# Patient Record
Sex: MALE | Race: BLACK OR AFRICAN AMERICAN | NOT HISPANIC OR LATINO | URBAN - METROPOLITAN AREA
[De-identification: names, ages, dates, MRNs, and addresses within clinical notes are randomized per-mention and may not be internally consistent; named-entity substitution may affect disease eponyms.]

---

## 2022-04-10 ENCOUNTER — HISTORICAL (OUTPATIENT)
Dept: ADMINISTRATIVE | Facility: HOSPITAL | Age: 32
End: 2022-04-10
Payer: MEDICAID

## 2022-04-28 VITALS
WEIGHT: 235.88 LBS | SYSTOLIC BLOOD PRESSURE: 144 MMHG | HEIGHT: 72 IN | BODY MASS INDEX: 31.95 KG/M2 | OXYGEN SATURATION: 97 % | DIASTOLIC BLOOD PRESSURE: 95 MMHG

## 2022-08-31 ENCOUNTER — OFFICE VISIT (OUTPATIENT)
Dept: URGENT CARE | Facility: CLINIC | Age: 32
End: 2022-08-31
Payer: MEDICAID

## 2022-08-31 VITALS
TEMPERATURE: 99 F | RESPIRATION RATE: 20 BRPM | DIASTOLIC BLOOD PRESSURE: 88 MMHG | OXYGEN SATURATION: 98 % | HEART RATE: 63 BPM | WEIGHT: 245.56 LBS | HEIGHT: 72 IN | BODY MASS INDEX: 33.26 KG/M2 | SYSTOLIC BLOOD PRESSURE: 134 MMHG

## 2022-08-31 DIAGNOSIS — Z11.52 ENCOUNTER FOR SCREENING FOR COVID-19: Primary | ICD-10-CM

## 2022-08-31 DIAGNOSIS — R05.9 COUGH: ICD-10-CM

## 2022-08-31 DIAGNOSIS — J01.90 ACUTE SINUSITIS, RECURRENCE NOT SPECIFIED, UNSPECIFIED LOCATION: ICD-10-CM

## 2022-08-31 LAB
CTP QC/QA: YES
CTP QC/QA: YES
FLUAV AG NPH QL: NEGATIVE
FLUBV AG NPH QL: NEGATIVE
SARS-COV-2 RDRP RESP QL NAA+PROBE: NEGATIVE

## 2022-08-31 PROCEDURE — 99213 OFFICE O/P EST LOW 20 MIN: CPT | Mod: S$PBB,,, | Performed by: FAMILY MEDICINE

## 2022-08-31 PROCEDURE — 99214 OFFICE O/P EST MOD 30 MIN: CPT | Mod: PBBFAC | Performed by: FAMILY MEDICINE

## 2022-08-31 PROCEDURE — 99213 PR OFFICE/OUTPT VISIT, EST, LEVL III, 20-29 MIN: ICD-10-PCS | Mod: S$PBB,,, | Performed by: FAMILY MEDICINE

## 2022-08-31 PROCEDURE — 87804 INFLUENZA ASSAY W/OPTIC: CPT | Mod: PBBFAC | Performed by: FAMILY MEDICINE

## 2022-08-31 PROCEDURE — U0002 COVID-19 LAB TEST NON-CDC: HCPCS | Mod: PBBFAC | Performed by: FAMILY MEDICINE

## 2022-08-31 RX ORDER — PROMETHAZINE HYDROCHLORIDE AND DEXTROMETHORPHAN HYDROBROMIDE 6.25; 15 MG/5ML; MG/5ML
5 SYRUP ORAL
Qty: 120 ML | Refills: 0 | Status: SHIPPED | OUTPATIENT
Start: 2022-08-31 | End: 2023-01-29

## 2022-08-31 RX ORDER — AZITHROMYCIN 250 MG/1
TABLET, FILM COATED ORAL
Qty: 6 TABLET | Refills: 0 | Status: SHIPPED | OUTPATIENT
Start: 2022-08-31 | End: 2022-09-05

## 2022-08-31 NOTE — PROGRESS NOTES
"Subjective:       Patient ID: Allan Jackson Jr. is a 31 y.o. male.    Vitals:  height is 6' 0.44" (1.84 m) and weight is 111.4 kg (245 lb 9.5 oz). His temperature is 98.6 °F (37 °C). His blood pressure is 134/88 and his pulse is 63. His respiration is 20 and oxygen saturation is 98%.     Chief Complaint: Cough (Coughing up a little blood - Entered by patient/X 1 1/2 wks)    HPI  1.5 weeks of sinus pressure, cough, rhinorrhea, coughed up a small amount bloody mucus yesterday.  Denies wheezing or shortness of breath.  No chest discomfort.  No known sick contacts  ROS    Constitutional: negative except as stated in HPI  Eye: negative except as stated in HPI  ENT: negative except as stated in HPI  Respiratory: negative except as stated in HPI  Cardiovascular: negative except as stated in HPI  Gastrointestinal: negative except as stated in HPI  Genitourinary: negative except as stated in HPI  Objective:      Physical Exam    GENERAL:  Nontoxic.  Well developed and well nourished.   Appears well hydrated.  No respiratory distress  HEAD:  No signs of head trauma.  EYES:  Pupils are equal.  Extraocular motions intact  NOSE:  No sinus tenderness.  Bilateral maxillary sinus tenderness  MOUTH:  Oropharynx is clear, uvula midline  NECK:  Supple, nontender, no masses.  Full range of motion without pain.  No meningismus     CHEST:  nontender to palpation, clear breath sounds bilaterally, no wheezes, crackles, or rhonchi.  No increased work of breathing  CARDIOVASCULAR:  Regular      Results for orders placed or performed in visit on 08/31/22   POCT COVID-19 Rapid Screening   Result Value Ref Range    POC Rapid COVID Negative Negative     Acceptable Yes    POCT Influenza A/B   Result Value Ref Range    Rapid Influenza A Ag Negative Negative    Rapid Influenza B Ag Negative Negative     Acceptable Yes        Assessment:       1. Encounter for screening for COVID-19    2. Cough    3. Acute sinusitis, " recurrence not specified, unspecified location            Plan:         Encounter for screening for COVID-19  -     POCT COVID-19 Rapid Screening    Cough  -     POCT Influenza A/B    Acute sinusitis, recurrence not specified, unspecified location    Other orders  -     azithromycin (Z-VEE) 250 MG tablet; Take 2 tablets (500 mg total) by mouth once daily for 1 day, THEN 1 tablet (250 mg total) once daily for 4 days.  Dispense: 6 tablet; Refill: 0  -     promethazine-dextromethorphan (PROMETHAZINE-DM) 6.25-15 mg/5 mL Syrp; Take 5 mLs by mouth every 6 to 8 hours as needed (cough).  Dispense: 120 mL; Refill: 0         Will treat as sinusitis.  Zithromax.  Phenergan DM with side effect warnings.  Please follow instructions on patient education material.  Return to urgent care in 2 to 3 days if symptoms are not improving, immediately if you develop any new or worsening symptoms.

## 2023-01-29 ENCOUNTER — OFFICE VISIT (OUTPATIENT)
Dept: URGENT CARE | Facility: CLINIC | Age: 33
End: 2023-01-29
Payer: MEDICAID

## 2023-01-29 VITALS
SYSTOLIC BLOOD PRESSURE: 144 MMHG | BODY MASS INDEX: 32.55 KG/M2 | WEIGHT: 240.31 LBS | OXYGEN SATURATION: 100 % | DIASTOLIC BLOOD PRESSURE: 98 MMHG | HEART RATE: 88 BPM | RESPIRATION RATE: 18 BRPM | TEMPERATURE: 98 F | HEIGHT: 72 IN

## 2023-01-29 DIAGNOSIS — U07.1 COVID-19: ICD-10-CM

## 2023-01-29 DIAGNOSIS — U07.1 COVID-19 VIRUS DETECTED: ICD-10-CM

## 2023-01-29 DIAGNOSIS — R68.89 FLU-LIKE SYMPTOMS: Primary | ICD-10-CM

## 2023-01-29 DIAGNOSIS — Z11.52 ENCOUNTER FOR SCREENING FOR COVID-19: ICD-10-CM

## 2023-01-29 LAB
CTP QC/QA: YES
CTP QC/QA: YES
POC MOLECULAR INFLUENZA A AGN: NEGATIVE
POC MOLECULAR INFLUENZA B AGN: NEGATIVE
SARS-COV-2 RDRP RESP QL NAA+PROBE: POSITIVE

## 2023-01-29 PROCEDURE — 99213 OFFICE O/P EST LOW 20 MIN: CPT | Mod: S$PBB,,, | Performed by: FAMILY MEDICINE

## 2023-01-29 PROCEDURE — 87502 INFLUENZA DNA AMP PROBE: CPT | Mod: PBBFAC | Performed by: FAMILY MEDICINE

## 2023-01-29 PROCEDURE — 99213 PR OFFICE/OUTPT VISIT, EST, LEVL III, 20-29 MIN: ICD-10-PCS | Mod: S$PBB,,, | Performed by: FAMILY MEDICINE

## 2023-01-29 PROCEDURE — 87635 SARS-COV-2 COVID-19 AMP PRB: CPT | Mod: PBBFAC | Performed by: FAMILY MEDICINE

## 2023-01-29 PROCEDURE — 99213 OFFICE O/P EST LOW 20 MIN: CPT | Mod: PBBFAC | Performed by: FAMILY MEDICINE

## 2023-01-29 RX ORDER — PROMETHAZINE HYDROCHLORIDE AND DEXTROMETHORPHAN HYDROBROMIDE 6.25; 15 MG/5ML; MG/5ML
5 SYRUP ORAL
Qty: 120 ML | Refills: 0 | Status: SHIPPED | OUTPATIENT
Start: 2023-01-29 | End: 2023-02-22

## 2023-01-29 RX ORDER — ONDANSETRON 8 MG/1
8 TABLET, ORALLY DISINTEGRATING ORAL EVERY 8 HOURS PRN
Qty: 10 TABLET | Refills: 0 | Status: SHIPPED | OUTPATIENT
Start: 2023-01-29

## 2023-01-29 NOTE — LETTER
January 29, 2023      [facility]  [facility] [address]  Phone: [phone]       Patient: Allan Jackson   YOB: 1990  Date of Visit: 01/29/2023    To Whom It May Concern:    Jose Luis Jackson  was at [facility] on 01/29/2023. The patient may return to work/school on 2/3/23. If you have any questions or concerns, or if I can be of further assistance, please do not hesitate to contact me.    Sincerely,    AILEEN Ochoa MD

## 2023-01-29 NOTE — LETTER
January 29, 2023      [facility]  [facility] [facility] [address]  Phone: [phone]       Patient: Allan Jackson   YOB: 1990  Date of Visit: 01/29/2023    To Whom It May Concern:    Jose Luis Jackson  was at [facility] on 01/29/2023. The patient may return to work/school on 1/30/23. If you have any questions or concerns, or if I can be of further assistance, please do not hesitate to contact me.    Sincerely,    AILEEN Ochoa MD

## 2023-01-29 NOTE — PROGRESS NOTES
"Subjective:       Patient ID: Allan Jackson Jr. is a 32 y.o. male.    Vitals:  height is 6' 0.44" (1.84 m) and weight is 109 kg (240 lb 4.8 oz). His temperature is 98.4 °F (36.9 °C). His blood pressure is 144/98 (abnormal) and his pulse is 88. His respiration is 18 and oxygen saturation is 100%.     Chief Complaint: Generalized Body Aches (Body aches, cough, vomiting, HA x 3 days Denies sore throat.)    Patient with several days of myalgias, vomiting, loose stool, no abdominal pain.  Headache with no neuro symptoms.  Cough without shortness of breath.  No chest discomfort.  Works as a     URI   Associated symptoms include joint pain and vomiting. Pertinent negatives include no abdominal pain, dysuria, joint swelling, rash, sore throat, swollen glands or wheezing.     HENT:  Negative for sore throat.    Respiratory:  Negative for wheezing.    Gastrointestinal:  Positive for vomiting. Negative for abdominal pain.   Genitourinary:  Negative for dysuria.   Skin:  Negative for rash.     Objective:      Physical Exam   Constitutional: He appears well-developed.  Non-toxic appearance. He does not appear ill. No distress.   HENT:   Head: Atraumatic.   Nose: No purulent discharge. Right sinus exhibits no maxillary sinus tenderness and no frontal sinus tenderness. Left sinus exhibits no maxillary sinus tenderness and no frontal sinus tenderness.   Eyes: Right eye exhibits no discharge. Left eye exhibits no discharge. Extraocular movement intact   Neck: Neck supple.   Cardiovascular: Regular rhythm.   Pulmonary/Chest: Effort normal and breath sounds normal. No respiratory distress. He has no wheezes. He has no rales.   Abdominal: Soft. There is no abdominal tenderness. There is no rebound and no guarding.   Lymphadenopathy:     He has no cervical adenopathy.   Neurological: He is alert.   Skin: Skin is warm, dry and not diaphoretic.   Psychiatric: His behavior is normal.   Nursing note and vitals reviewed.    "   Results for orders placed or performed in visit on 01/29/23   POCT COVID-19 Rapid Screening   Result Value Ref Range    POC Rapid COVID Positive (A) Negative     Acceptable Yes    POCT Influenza A/B MOLECULAR   Result Value Ref Range    POC Molecular Influenza A Ag Negative Negative, Not Reported    POC Molecular Influenza B Ag Negative Negative, Not Reported     Acceptable Yes        Assessment:       1. Flu-like symptoms    2. Encounter for screening for COVID-19    3. COVID-19            Plan:         Flu-like symptoms  -     POCT COVID-19 Rapid Screening  -     POCT Influenza A/B MOLECULAR    Encounter for screening for COVID-19  -     POCT COVID-19 Rapid Screening  -     POCT Influenza A/B MOLECULAR    COVID-19    Other orders  -     promethazine-dextromethorphan (PROMETHAZINE-DM) 6.25-15 mg/5 mL Syrp; Take 5 mLs by mouth every 6 to 8 hours as needed (cough). May cause sedation.  Do not drive or operate heavy machinery.  Dispense: 120 mL; Refill: 0  -     ondansetron (ZOFRAN-ODT) 8 MG TbDL; Take 1 tablet (8 mg total) by mouth every 8 (eight) hours as needed (nausea).  Dispense: 10 tablet; Refill: 0         Prescription for Phenergan DM with side effect precautions, use this before bedtime.  May use Zofran during the day.  Advised not to take at the same time.  Discussed COVID 19 isolation instructions, contagious precautions.  Use symptomatic medications as needed.  ER precautions.  Please read patient education material.

## 2023-02-22 ENCOUNTER — OFFICE VISIT (OUTPATIENT)
Dept: URGENT CARE | Facility: CLINIC | Age: 33
End: 2023-02-22
Payer: MEDICAID

## 2023-02-22 VITALS
TEMPERATURE: 98 F | WEIGHT: 241 LBS | BODY MASS INDEX: 32.64 KG/M2 | HEIGHT: 72 IN | SYSTOLIC BLOOD PRESSURE: 111 MMHG | DIASTOLIC BLOOD PRESSURE: 82 MMHG | HEART RATE: 84 BPM | RESPIRATION RATE: 18 BRPM | OXYGEN SATURATION: 99 %

## 2023-02-22 DIAGNOSIS — J01.90 ACUTE SINUSITIS, RECURRENCE NOT SPECIFIED, UNSPECIFIED LOCATION: Primary | ICD-10-CM

## 2023-02-22 PROCEDURE — 99213 PR OFFICE/OUTPT VISIT, EST, LEVL III, 20-29 MIN: ICD-10-PCS | Mod: S$PBB,,, | Performed by: FAMILY MEDICINE

## 2023-02-22 PROCEDURE — 99213 OFFICE O/P EST LOW 20 MIN: CPT | Mod: S$PBB,,, | Performed by: FAMILY MEDICINE

## 2023-02-22 PROCEDURE — 99214 OFFICE O/P EST MOD 30 MIN: CPT | Mod: PBBFAC | Performed by: FAMILY MEDICINE

## 2023-02-22 RX ORDER — DOXYCYCLINE 100 MG/1
100 CAPSULE ORAL EVERY 12 HOURS
Qty: 20 CAPSULE | Refills: 0 | Status: SHIPPED | OUTPATIENT
Start: 2023-02-22 | End: 2023-03-04

## 2023-02-22 RX ORDER — ALBUTEROL SULFATE 90 UG/1
2 AEROSOL, METERED RESPIRATORY (INHALATION) EVERY 6 HOURS PRN
Qty: 18 G | Refills: 2 | Status: SHIPPED | OUTPATIENT
Start: 2023-02-22 | End: 2024-02-22

## 2023-02-22 RX ORDER — PROMETHAZINE HYDROCHLORIDE AND DEXTROMETHORPHAN HYDROBROMIDE 6.25; 15 MG/5ML; MG/5ML
5 SYRUP ORAL
Qty: 120 ML | Refills: 0 | Status: SHIPPED | OUTPATIENT
Start: 2023-02-22

## 2023-02-22 NOTE — PROGRESS NOTES
"Subjective:       Patient ID: Allan Jackson Jr. is a 32 y.o. male.    Vitals:  height is 6' 0.44" (1.84 m) and weight is 109.3 kg (241 lb). His oral temperature is 98.1 °F (36.7 °C). His blood pressure is 111/82 and his pulse is 84. His respiration is 18 and oxygen saturation is 99%.     Chief Complaint: Cough, Sinus Problem, and Otalgia    4 days of sinus pressure, cough with wheezing, no fever.  Right ear pressure, popping.  Had COVID 1 month ago    Cough  Associated symptoms include ear pain, nasal congestion and postnasal drip. Pertinent negatives include no chills, hemoptysis, rash or sore throat.   Sinus Problem  Associated symptoms include coughing and ear pain. Pertinent negatives include no chills, neck pain, sore throat or swollen glands.   Otalgia   Associated symptoms include coughing. Pertinent negatives include no neck pain, rash or sore throat.     Constitution: Negative for chills.   HENT:  Positive for ear pain and postnasal drip. Negative for sore throat.    Neck: Negative for neck pain.   Respiratory:  Positive for cough. Negative for bloody sputum.    Skin:  Negative for rash.     Objective:      Physical Exam   Constitutional: He appears well-developed.  Non-toxic appearance. He does not appear ill. No distress.   HENT:   Head: Atraumatic.   Ears:   Right Ear: Tympanic membrane normal.   Left Ear: Tympanic membrane normal.   Nose: No purulent discharge. Right sinus exhibits maxillary sinus tenderness. Right sinus exhibits no frontal sinus tenderness. Left sinus exhibits maxillary sinus tenderness. Left sinus exhibits no frontal sinus tenderness.   Mouth/Throat: No oropharyngeal exudate or posterior oropharyngeal erythema.   Eyes: Right eye exhibits no discharge. Left eye exhibits no discharge. Extraocular movement intact   Neck: Neck supple.   Cardiovascular: Regular rhythm.   Pulmonary/Chest: Effort normal and breath sounds normal. No respiratory distress. He has no wheezes. He has no rales. "   Lymphadenopathy:     He has no cervical adenopathy.   Neurological: He is alert.   Skin: Skin is warm, dry and not diaphoretic.   Psychiatric: His behavior is normal.   Nursing note and vitals reviewed.      Assessment:       1. Acute sinusitis, recurrence not specified, unspecified location            Plan:         Acute sinusitis, recurrence not specified, unspecified location    Other orders  -     doxycycline (VIBRAMYCIN) 100 MG Cap; Take 1 capsule (100 mg total) by mouth every 12 (twelve) hours. for 10 days  Dispense: 20 capsule; Refill: 0  -     albuterol (PROVENTIL HFA) 90 mcg/actuation inhaler; Inhale 2 puffs into the lungs every 6 (six) hours as needed for Wheezing. Rescue  Dispense: 18 g; Refill: 2  -     promethazine-dextromethorphan (PROMETHAZINE-DM) 6.25-15 mg/5 mL Syrp; Take 5 mLs by mouth every 6 to 8 hours as needed (cough). May cause sedation.  Do not drive or operate heavy machinery.  Dispense: 120 mL; Refill: 0    Will treat as sinusitis.  Take medications as prescribed.  Consider intranasal steroids like Flonase and other over-the-counter medications to treat your symptoms.  Consider saline nasal rinses.  Please follow instructions on patient education material.  Return to urgent care in 3-4 days if symptoms are not improving, immediately if you develop any new or worsening symptoms.

## 2023-02-22 NOTE — LETTER
February 22, 2023      [facility]  [facility] [facility] [address]  Phone: [phone]       Patient: Allan Jackson   YOB: 1990  Date of Visit: 02/22/2023    To Whom It May Concern:    Jose Luis Jackson  was at [facility] on 02/22/2023. The patient may return to work/school on 2/23/2023 with no restrictions. If you have any questions or concerns, or if I can be of further assistance, please do not hesitate to contact me.    Sincerely,    AILEEN Ochoa MD

## 2023-08-30 ENCOUNTER — HOSPITAL ENCOUNTER (EMERGENCY)
Facility: HOSPITAL | Age: 33
Discharge: HOME | End: 2023-08-30
Attending: INTERNAL MEDICINE
Payer: MEDICAID

## 2023-08-30 VITALS
SYSTOLIC BLOOD PRESSURE: 140 MMHG | WEIGHT: 212.75 LBS | TEMPERATURE: 98 F | DIASTOLIC BLOOD PRESSURE: 91 MMHG | HEIGHT: 72 IN | BODY MASS INDEX: 28.82 KG/M2 | RESPIRATION RATE: 20 BRPM | HEART RATE: 109 BPM | OXYGEN SATURATION: 100 %

## 2023-08-30 DIAGNOSIS — B96.89 BACTERIAL INFECTION OF SKIN: ICD-10-CM

## 2023-08-30 DIAGNOSIS — Z87.2 HISTORY OF PILONIDAL CYST: Primary | ICD-10-CM

## 2023-08-30 DIAGNOSIS — L08.9 BACTERIAL INFECTION OF SKIN: ICD-10-CM

## 2023-08-30 LAB — POCT GLUCOSE: 141 MG/DL (ref 70–110)

## 2023-08-30 PROCEDURE — 99284 EMERGENCY DEPT VISIT MOD MDM: CPT

## 2023-08-30 PROCEDURE — 82962 GLUCOSE BLOOD TEST: CPT

## 2023-08-30 PROCEDURE — 25000003 PHARM REV CODE 250: Performed by: NURSE PRACTITIONER

## 2023-08-30 RX ORDER — KETOROLAC TROMETHAMINE 10 MG/1
10 TABLET, FILM COATED ORAL
Status: COMPLETED | OUTPATIENT
Start: 2023-08-30 | End: 2023-08-30

## 2023-08-30 RX ORDER — SULFAMETHOXAZOLE AND TRIMETHOPRIM 800; 160 MG/1; MG/1
1 TABLET ORAL 2 TIMES DAILY
Qty: 14 TABLET | Refills: 0 | Status: SHIPPED | OUTPATIENT
Start: 2023-08-30 | End: 2023-09-06

## 2023-08-30 RX ORDER — INDOMETHACIN 25 MG/1
25 CAPSULE ORAL 2 TIMES DAILY PRN
Qty: 14 CAPSULE | Refills: 0 | Status: SHIPPED | OUTPATIENT
Start: 2023-08-30

## 2023-08-30 RX ADMIN — KETOROLAC TROMETHAMINE 10 MG: 10 TABLET, FILM COATED ORAL at 09:08

## 2023-08-30 NOTE — Clinical Note
"Allan Pitts" Manuel was seen and treated in our emergency department on 8/30/2023.  He may return to school on 09/01/2023.      If you have any questions or concerns, please don't hesitate to call.      Leeann CH"

## 2023-08-30 NOTE — Clinical Note
"Allan Pitts" Manuel was seen and treated in our emergency department on 8/30/2023.  He may return to work on 09/01/2023.       If you have any questions or concerns, please don't hesitate to call.      Leeann CH    "

## 2023-08-30 NOTE — DISCHARGE INSTRUCTIONS
Warm compresses to affected area 3 times daily.  Return to the [facility] ED in 3 days for worsening redness, tenderness, or drainage from area.  Follow up with your primary care physician in 3-5 days for follow up evaluation.  Take medication as prescribed.  Take pain medication as prescribed for no more than 7 days.

## 2023-08-30 NOTE — ED PROVIDER NOTES
"Encounter Date: 8/30/2023       History     Chief Complaint   Patient presents with    Abscess     C/o abscess to sacral area x 4 days     Pt is a 32 y.o. male who presents to the [facility] ED complaining of tenderness to the upper aspect of his rectal crease. Reports symptoms x 2 days. Denies open wounds to area, chest pain, SOB, weakness, dizziness, redness to area, or loss of bowel or bladder control. Pt reports having an abscess to the area in the past that required I&D. Reports current symptoms have not become "as bad" as his previous episode but wants to treat it before it needs to be cut open. Pt with HX of DM. Reports dietary changes and reports his blood sugar has been "great."       Review of patient's allergies indicates:  No Known Allergies  Past Medical History:   Diagnosis Date    Diabetes mellitus      Past Surgical History:   Procedure Laterality Date    ORIF FACIAL FRACTURE Left      Family History   Problem Relation Age of Onset    Kidney failure Mother     Diabetes type I Mother     Hypertension Father     Diabetes type I Father      Social History     Tobacco Use    Smoking status: Every Day     Current packs/day: 0.50     Types: Cigarettes    Smokeless tobacco: Never   Substance Use Topics    Alcohol use: Never    Drug use: Yes     Frequency: 2.0 times per week     Types: Marijuana     Review of Systems   Constitutional:  Negative for chills, diaphoresis, fatigue and fever.   HENT:  Negative for facial swelling, postnasal drip, rhinorrhea, sinus pressure, sinus pain, sore throat and trouble swallowing.    Respiratory:  Negative for cough, chest tightness, shortness of breath and wheezing.    Cardiovascular:  Negative for chest pain, palpitations and leg swelling.   Gastrointestinal:  Negative for abdominal pain, diarrhea, nausea and vomiting.   Genitourinary:  Negative for dysuria, flank pain, hematuria and urgency.   Musculoskeletal:  Positive for myalgias. Negative for arthralgias and back pain. "   Skin:  Negative for color change and rash.   Neurological:  Negative for dizziness, syncope, weakness and headaches.   Hematological:  Does not bruise/bleed easily.   All other systems reviewed and are negative.      Physical Exam     Initial Vitals [08/30/23 0919]   BP Pulse Resp Temp SpO2   (!) 140/91 109 20 98.1 °F (36.7 °C) 100 %      MAP       --         Physical Exam    Nursing note and vitals reviewed.  Constitutional: Vital signs are normal. He appears well-developed and well-nourished.   HENT:   Head: Normocephalic.   Nose: Nose normal.   Mouth/Throat: Oropharynx is clear and moist.   Eyes: Conjunctivae and EOM are normal. Pupils are equal, round, and reactive to light.   Neck: Neck supple.   Normal range of motion.  Cardiovascular:  Normal rate, regular rhythm, normal heart sounds and intact distal pulses.           Pulmonary/Chest: Effort normal and breath sounds normal. No respiratory distress. He has no wheezes. He has no rhonchi. He has no rales. He exhibits no tenderness.   Abdominal: Abdomen is soft and flat. Bowel sounds are normal. There is no abdominal tenderness. There is no rebound, no guarding, no tenderness at McBurney's point and negative Thornton's sign.   Musculoskeletal:         General: Normal range of motion.      Cervical back: Normal range of motion and neck supple.      Lumbar back: Tenderness present.        Back:      Neurological: He is alert and oriented to person, place, and time. He has normal strength.   Skin: Skin is warm and dry. Capillary refill takes less than 2 seconds.   Psychiatric: He has a normal mood and affect. His behavior is normal. Judgment and thought content normal.         ED Course   Procedures  Labs Reviewed   POCT GLUCOSE - Abnormal; Notable for the following components:       Result Value    POCT Glucose 141 (*)     All other components within normal limits   POCT GLUCOSE, HAND-HELD DEVICE          Imaging Results    None          Medications   ketorolac  tablet 10 mg (has no administration in time range)     Medical Decision Making  Differential:  Pilonidal cyst  Cellulitis  Pilonidal abscess    Risk  Prescription drug management.               ED Course as of 08/30/23 0932   Wed Aug 30, 2023   0927 9:27 AM Due to pt symptoms and history, I have discussed I&D of site as a treatment option. I cannot completely rule out early abscess to affected area of pt  despite no palpable fluctuance secondary to inflammation. I have discussed in detail with pt the treatment option of I&D versus warm compresses at home with anti-inflammatory medication and antibiotics. Pt requesting to try oral medication prior to I&D. I have discussed in detail with pt the plan for him to use compresses and prescribed medication. Pt is to return to the [facility] ED in 3 days for re-evaluation of site if symptoms worsen, redness occurs, or he develops fever, thirst, or polyuria. Follow up instructions and return to ED instruction have been given. All questions and concerns were addressed at this time. Patient voices understanding of information and instructions. Patient is comfortable with plan and discharge. Patient is stable for discharge.    [JA]      ED Course User Index  [JA] Allan Garcia Jr., FNP                    Clinical Impression:   Final diagnoses:  [Z87.2] History of pilonidal cyst (Primary)  [L08.9, B96.89] Bacterial infection of skin        ED Disposition Condition    Discharge Stable          ED Prescriptions       Medication Sig Dispense Start Date End Date Auth. Provider    indomethacin (INDOCIN) 25 MG capsule Take 1 capsule (25 mg total) by mouth 2 (two) times daily as needed (pain). 14 capsule 8/30/2023 -- Allan Garcia Jr., FNP    sulfamethoxazole-trimethoprim 800-160mg (BACTRIM DS) 800-160 mg Tab Take 1 tablet by mouth 2 (two) times daily. for 7 days 14 tablet 8/30/2023 9/6/2023 Allan Garcia Jr., FNP          Follow-up Information       Follow up With Specialties  Details Why Contact Info    [facility] Emergency Medicine In 3 days As needed, If symptoms worsen [address] [facility] [address] [phone]             Allan Garcia Jr., [facility]  08/30/23 0919

## 2023-09-01 ENCOUNTER — HOSPITAL ENCOUNTER (EMERGENCY)
Facility: HOSPITAL | Age: 33
Discharge: HOME | End: 2023-09-01
Attending: INTERNAL MEDICINE
Payer: MEDICAID

## 2023-09-01 VITALS
WEIGHT: 209.44 LBS | TEMPERATURE: 98 F | BODY MASS INDEX: 28.4 KG/M2 | RESPIRATION RATE: 18 BRPM | SYSTOLIC BLOOD PRESSURE: 121 MMHG | HEART RATE: 118 BPM | DIASTOLIC BLOOD PRESSURE: 80 MMHG | OXYGEN SATURATION: 100 %

## 2023-09-01 DIAGNOSIS — L05.01 PILONIDAL CYST WITH ABSCESS: Primary | ICD-10-CM

## 2023-09-01 DIAGNOSIS — Z87.2 HX OF PILONIDAL CYST: ICD-10-CM

## 2023-09-01 LAB — POCT GLUCOSE: 113 MG/DL (ref 70–110)

## 2023-09-01 PROCEDURE — 10080 I&D PILONIDAL CYST SIMPLE: CPT

## 2023-09-01 PROCEDURE — 82962 GLUCOSE BLOOD TEST: CPT

## 2023-09-01 PROCEDURE — 25000003 PHARM REV CODE 250

## 2023-09-01 PROCEDURE — 99284 EMERGENCY DEPT VISIT MOD MDM: CPT | Mod: 25

## 2023-09-01 RX ORDER — HYDROCODONE BITARTRATE AND ACETAMINOPHEN 7.5; 325 MG/1; MG/1
1 TABLET ORAL EVERY 6 HOURS PRN
Status: DISCONTINUED | OUTPATIENT
Start: 2023-09-01 | End: 2023-09-01 | Stop reason: HOSPADM

## 2023-09-01 RX ORDER — LIDOCAINE HYDROCHLORIDE 10 MG/ML
10 INJECTION, SOLUTION EPIDURAL; INFILTRATION; INTRACAUDAL; PERINEURAL
Status: DISCONTINUED | OUTPATIENT
Start: 2023-09-01 | End: 2023-09-01 | Stop reason: HOSPADM

## 2023-09-01 RX ADMIN — HYDROCODONE BITARTRATE AND ACETAMINOPHEN 1 TABLET: 7.5; 325 TABLET ORAL at 08:09

## 2023-09-01 NOTE — ED PROVIDER NOTES
Encounter Date: 9/1/2023       History     Chief Complaint   Patient presents with    Abscess     Fu abscess, only slight improvement on antibiotics.  .      32 year old male with PMHx diabetes presenting with pilonidal cyst that has been present since earlier this week. Patient came to ED on 8/30 for same problem and was prescribed 7 day course of Bactrim and to return If symptoms worsened. Pain continued to worsen and patient with severe discomfort, could not sit down. History of cysts in past with I and D. Last one 2 years ago. Denies fevers/chills, nausea/vomiting, diarrhea/constipation, chest pain, SOB.     The history is provided by the patient.     Review of patient's allergies indicates:  No Known Allergies  Past Medical History:   Diagnosis Date    Diabetes mellitus      Past Surgical History:   Procedure Laterality Date    ORIF FACIAL FRACTURE Left      Family History   Problem Relation Age of Onset    Kidney failure Mother     Diabetes type I Mother     Hypertension Father     Diabetes type I Father      Social History     Tobacco Use    Smoking status: Every Day     Current packs/day: 0.50     Types: Cigarettes    Smokeless tobacco: Never   Substance Use Topics    Alcohol use: Never    Drug use: Yes     Frequency: 2.0 times per week     Types: Marijuana     Review of Systems   Constitutional:  Negative for chills and fatigue.   Respiratory:  Negative for cough and shortness of breath.    Cardiovascular:  Negative for chest pain and leg swelling.   Gastrointestinal:  Negative for abdominal pain, constipation, diarrhea, nausea and vomiting.   Genitourinary:  Negative for dysuria and hematuria.   Musculoskeletal:  Negative for gait problem.   Skin:  Positive for wound (rectal cyst).   Neurological:  Negative for dizziness and headaches.       Physical Exam     Initial Vitals [09/01/23 0756]   BP Pulse Resp Temp SpO2   121/80 (!) 118 18 97.5 °F (36.4 °C) 100 %      MAP       --         Physical  Exam    Nursing note and vitals reviewed.  Constitutional: He appears well-developed and well-nourished.   HENT:   Head: Normocephalic and atraumatic.   Eyes: Conjunctivae are normal. Pupils are equal, round, and reactive to light.   Neck:   Normal range of motion.  Cardiovascular:  Normal rate, regular rhythm and normal heart sounds.           Pulmonary/Chest: Breath sounds normal.   Abdominal: Abdomen is soft.   Musculoskeletal:         General: Normal range of motion.      Cervical back: Normal range of motion.     Neurological: He is alert and oriented to person, place, and time.   Skin:   Pilonidal cyst at top of rectal crease with fluctuance and induration. Significant pain on palpation.          ED Course   I & D - Incision and Drainage    Date/Time: 9/1/2023 9:01 AM  Location procedure was performed: [facility] EMERGENCY DEPARTMENT    Performed by: Inna Butterfield MD  Authorized by: Marvel Andrews MD  Assisting provider: Marvel Andrews MD  Pre-operative diagnosis: pilonidal cyst  Post-operative diagnosis: pilonidal cyst  Consent Done: Yes  Consent: Verbal consent obtained.  Risks and benefits: risks, benefits and alternatives were discussed  Consent given by: patient  Patient identity confirmed: verbally with patient  Type: pilonidal cyst  Body area: anogenital  Location details: pilonidal  Anesthesia: local infiltration    Anesthesia:  Local Anesthetic: lidocaine 1% without epinephrine  Anesthetic total: 5 mL    Patient sedated: no  Description of findings: skin penetrated and drained of pus and blood   Scalpel size: 11  Incision type: single straight  Incision depth: dermal  Complexity: simple  Drainage: pus and bloody  Drainage amount: moderate  Wound treatment: wound packed, incision and drainage  Complications: No  Estimated blood loss (mL): 2  Specimens: No  Implants: No    Incision depth: dermal        Labs Reviewed   POCT GLUCOSE - Abnormal; Notable for the following  components:       Result Value    POCT Glucose 113 (*)     All other components within normal limits          Imaging Results    None          Medications   HYDROcodone-acetaminophen 7.5-325 mg per tablet 1 tablet (1 tablet Oral Given 9/1/23 0844)   LIDOcaine (PF) 10 mg/ml (1%) injection 100 mg (has no administration in time range)     Medical Decision Making  32 year old presenting with pilonidal cyst presenting with increased pain. Bedside US showing moderate fluid collection. ID performed bedside with suction of pus and blood from cyst area and packed with betadine gauze. Patient given instructions to return to ED in 2 days to take out packing. Instructed to finish bactrim course that was prescribed on 8/30 and indomethacin for pain as needed. Referral to general surgery for recurrent pilonidal cysts. Stable for discharge.     Risk  Prescription drug management.              Attending Attestation:   Physician Attestation Statement for Resident:  As the supervising MD   Physician Attestation Statement: I have personally seen and examined this patient.   I agree with the above history.  -:   As the supervising MD I agree with the above PE.     As the supervising MD I agree with the above treatment, course, plan, and disposition.   I was personally present during the entire procedure.  I have reviewed and agree with the residents interpretation of the following: lab data.  I have reviewed the following: old records at this facility.                                Clinical Impression:   Final diagnoses:  [L05.01] Pilonidal cyst with abscess (Primary)  [Z87.2] Hx of pilonidal cyst        ED Disposition Condition    Discharge Stable          ED Prescriptions    None       Follow-up Information       Follow up With Specialties Details Why Contact Info    [facility] - Emergency Dept Emergency Medicine  If symptoms worsen [address] [facility] [facility] [address] [phone]    Marvel Andrews  MD [facility] Internal Medicine In 2 days For wound re-check [address] [facility] [address]  [phone]               Inna Butterfield MD  Resident  09/01/23 0919       Marvel Andrews MD  09/01/23 7632

## 2023-09-01 NOTE — FIRST PROVIDER EVALUATION
Medical screening examination initiated.  I have conducted a focused provider triage encounter, findings are as follows:    Brief history of present illness:  lower back pain near gluteal cleft consistent with previous pilonidal cyst    There were no vitals filed for this visit.    Pertinent physical exam:  tenderness and fluctuance in the gluteal cleft    Brief workup plan:  evaluate for I&D    Preliminary workup initiated; this workup will be continued and followed by the physician or advanced practice provider that is assigned to the patient when roomed.

## 2023-09-01 NOTE — Clinical Note
"Allan Pitts" Manuel was seen and treated in our emergency department on 9/1/2023.  He may return to work on 09/04/2023.       If you have any questions or concerns, please don't hesitate to call.      Dr. Emil CH    "

## 2023-09-03 ENCOUNTER — HOSPITAL ENCOUNTER (EMERGENCY)
Facility: HOSPITAL | Age: 33
Discharge: HOME | End: 2023-09-03
Attending: INTERNAL MEDICINE
Payer: MEDICAID

## 2023-09-03 VITALS
DIASTOLIC BLOOD PRESSURE: 80 MMHG | BODY MASS INDEX: 28.66 KG/M2 | RESPIRATION RATE: 16 BRPM | WEIGHT: 211.63 LBS | SYSTOLIC BLOOD PRESSURE: 118 MMHG | OXYGEN SATURATION: 100 % | TEMPERATURE: 98 F | HEART RATE: 87 BPM | HEIGHT: 72 IN

## 2023-09-03 DIAGNOSIS — Z51.89 WOUND CHECK, ABSCESS: Primary | ICD-10-CM

## 2023-09-03 PROCEDURE — 99281 EMR DPT VST MAYX REQ PHY/QHP: CPT

## 2023-09-03 NOTE — ED PROVIDER NOTES
Encounter Date: 9/3/2023       History     Chief Complaint   Patient presents with    Wound Check     In to have packing removed after drainage x2 days ago     Presents for pilonidal cyst I/D packing removal. I/D performed 2 days ago. Hx of DM. Denies fever, states pain improving, still draining    The history is provided by the patient.     Review of patient's allergies indicates:  No Known Allergies  Past Medical History:   Diagnosis Date    Diabetes mellitus      Past Surgical History:   Procedure Laterality Date    ORIF FACIAL FRACTURE Left      Family History   Problem Relation Age of Onset    Kidney failure Mother     Diabetes type I Mother     Hypertension Father     Diabetes type I Father      Social History     Tobacco Use    Smoking status: Every Day     Current packs/day: 0.50     Types: Cigarettes    Smokeless tobacco: Never   Substance Use Topics    Alcohol use: Never    Drug use: Yes     Frequency: 2.0 times per week     Types: Marijuana     Review of Systems   Constitutional:  Negative for fever.   HENT:  Negative for sore throat.    Respiratory:  Negative for shortness of breath.    Cardiovascular:  Negative for chest pain.   Gastrointestinal:  Negative for nausea.   Genitourinary:  Negative for dysuria.   Musculoskeletal:  Negative for back pain.   Skin:  Positive for wound. Negative for rash.   Neurological:  Negative for weakness.   Hematological:  Does not bruise/bleed easily.   All other systems reviewed and are negative.      Physical Exam     Initial Vitals [09/03/23 0808]   BP Pulse Resp Temp SpO2   118/80 87 18 97.9 °F (36.6 °C) 100 %      MAP       --         Physical Exam    Nursing note and vitals reviewed.  Constitutional: He appears well-developed and well-nourished. No distress.   HENT:   Head: Normocephalic and atraumatic.   Eyes: Pupils are equal, round, and reactive to light.   Neck: Neck supple.   Normal range of motion.  Cardiovascular:  Regular rhythm, normal heart sounds and  intact distal pulses.           Pulmonary/Chest: Breath sounds normal. No respiratory distress.   Musculoskeletal:         General: No edema. Normal range of motion.      Cervical back: Normal range of motion and neck supple.     Neurological: He is alert and oriented to person, place, and time. He has normal strength.   Skin: Skin is warm and dry. No rash noted.   Lower back area I/D wound with associated purulent drainage, mild tenderness.  Packing removed   Psychiatric: His behavior is normal.         ED Course   Procedures  Labs Reviewed - No data to display       Imaging Results    None          Medications - No data to display  Medical Decision Making  Amount and/or Complexity of Data Reviewed  External Data Reviewed: notes.     Details: 9/1/23 I/D procedure performed    Risk  OTC drugs.  Minor surgery with no identified risk factors.                               Clinical Impression:   Final diagnoses:  [Z51.89] Wound check, abscess (Primary)        ED Disposition Condition    Discharge Stable          ED Prescriptions    None       Follow-up Information       Follow up With Specialties Details Why Contact Info    [facility] - Emergency Dept Emergency Medicine  If symptoms worsen [address] [facility] [address] [phone]    [facility]  Schedule an appointment as soon as possible for a visit in 2 weeks  [facility] [facility] [address]             Marvel Andrews MD  09/03/23 7090

## 2023-09-03 NOTE — Clinical Note
"Allan Pitts" Manuel was seen and treated in our emergency department on 9/3/2023.  He may return to work on 09/06/2023.       If you have any questions or concerns, please don't hesitate to call.      Marvel Andrews MD"

## 2023-09-05 ENCOUNTER — PATIENT OUTREACH (OUTPATIENT)
Dept: EMERGENCY MEDICINE | Facility: HOSPITAL | Age: 33
End: 2023-09-05
Payer: MEDICAID

## 2023-09-06 NOTE — PROGRESS NOTES
"Spoke to patient for possible ED navigation enrollment. States almost finished with antibiotic. Packing was removed, still slight drainage. No s/s infection such as redness, swelling, fever. Pt states he was referred to surgery clinic, provided number to clinic to check on referral. States "not a good time" to have surgery. Declines appt with pcp. Asked to call back in future if wants pcp resources or appt. Use walk in clinic for minor needs. Episode closed.     "

## 2023-10-10 ENCOUNTER — OFFICE VISIT (OUTPATIENT)
Dept: SURGERY | Facility: CLINIC | Age: 33
End: 2023-10-10
Payer: MEDICAID

## 2023-10-10 VITALS
WEIGHT: 211.63 LBS | HEIGHT: 72 IN | BODY MASS INDEX: 28.66 KG/M2 | HEART RATE: 98 BPM | DIASTOLIC BLOOD PRESSURE: 89 MMHG | TEMPERATURE: 99 F | RESPIRATION RATE: 18 BRPM | OXYGEN SATURATION: 98 % | SYSTOLIC BLOOD PRESSURE: 128 MMHG

## 2023-10-10 DIAGNOSIS — Z87.2 HX OF PILONIDAL CYST: ICD-10-CM

## 2023-10-10 PROCEDURE — 99214 OFFICE O/P EST MOD 30 MIN: CPT | Mod: PBBFAC

## 2023-10-10 RX ORDER — IBUPROFEN 200 MG
1 TABLET ORAL DAILY
Qty: 30 PATCH | Refills: 3 | Status: SHIPPED | OUTPATIENT
Start: 2023-10-10

## 2023-10-10 NOTE — PROGRESS NOTES
Pt seen by Dr. James; Pt instructed to return to clinic in 3 months; Discharge paperwork given w/pt verbalizing understanding

## 2023-10-10 NOTE — PROGRESS NOTES
[facility]  History & Physical     Subjective:     Chief Complaint/Reason for Admission: Pilonidal disease    History of Present Illness:   Patient 33 y.o. male presents with 3 year history of pilonidal disease with yearly flares/infection, that has required two I&Ds. Last infection was 2 months ago, has since healed well. Has no active draining or pain. Denies fevers, chills, drainage, pain. Has regular bowel movements. Reports that sometimes will feel a flare of his pilonidal cyst and do a epsom salt sitz bath, which usually will help. He smokes 1 pack of cigarettes a day. Has a history of pre-diabetes, however has loast 60lbs and states he no longer has blood sugar issues, last HbA1c was in 2021 and was 9.3 (this was prior to his lifestyle modifications). Does not take any medications regularly. Only surgery was the bedside I&Ds and a jaw surgery.      There are no problems to display for this patient.       (Not in a hospital admission)    Review of patient's allergies indicates:  No Known Allergies     Past Medical History:   Diagnosis Date    Diabetes mellitus       Past Surgical History:   Procedure Laterality Date    ORIF FACIAL FRACTURE Left       Family History   Problem Relation Age of Onset    Kidney failure Mother     Diabetes type I Mother     Hypertension Father     Diabetes type I Father       Social History     Tobacco Use    Smoking status: Every Day     Current packs/day: 1.00     Types: Cigarettes    Smokeless tobacco: Never   Substance Use Topics    Alcohol use: Never        Review of Systems  14 point ROS negative except for as above HPI.       Physical Exam  NAD, comfortable  RRR  No increased work of breathing  Abdomen soft, NT/ND  Rectal: small area 3cm at the superior gluteal cleft with small sinuses and fullness/firm scar tissue, no tenderness and no drainage, no erythema      ASSESSMENT/PLAN:   32yo M with pilonidal disease.    -Smoking cessation, script for Nicotine  patches  -Will get repeat HbA1C  - RTC in 3 months for re-evaluation for surgery - patient would like to wait on surgery at this time, this will also allow for him to cut down/quit smoking to improve wound healing.     Pari James MD  [employer] general surgery, PGY5

## 2023-10-11 NOTE — PROGRESS NOTES
I have reviewed the notes, assessments, and/or procedures performed by the resident, I concur with her/his documentation of Allan Jackson Jr..     Lorena Silva MD

## 2025-01-28 ENCOUNTER — HOSPITAL ENCOUNTER (EMERGENCY)
Facility: HOSPITAL | Age: 35
Discharge: HOME | End: 2025-01-29
Attending: INTERNAL MEDICINE

## 2025-01-28 DIAGNOSIS — L05.01 PILONIDAL ABSCESS: Primary | ICD-10-CM

## 2025-01-28 LAB — POCT GLUCOSE: 113 MG/DL (ref 70–110)

## 2025-01-28 PROCEDURE — 82962 GLUCOSE BLOOD TEST: CPT

## 2025-01-28 PROCEDURE — 96372 THER/PROPH/DIAG INJ SC/IM: CPT | Performed by: INTERNAL MEDICINE

## 2025-01-28 PROCEDURE — 99284 EMERGENCY DEPT VISIT MOD MDM: CPT | Mod: 25

## 2025-01-28 PROCEDURE — 63600175 PHARM REV CODE 636 W HCPCS: Mod: JZ,TB | Performed by: INTERNAL MEDICINE

## 2025-01-28 PROCEDURE — 10080 I&D PILONIDAL CYST SIMPLE: CPT

## 2025-01-28 RX ORDER — IBUPROFEN 600 MG/1
600 TABLET ORAL EVERY 8 HOURS PRN
Qty: 20 TABLET | Refills: 0 | Status: SHIPPED | OUTPATIENT
Start: 2025-01-28

## 2025-01-28 RX ORDER — SULFAMETHOXAZOLE AND TRIMETHOPRIM 800; 160 MG/1; MG/1
1 TABLET ORAL 2 TIMES DAILY
Qty: 20 TABLET | Refills: 0 | Status: SHIPPED | OUTPATIENT
Start: 2025-01-28 | End: 2025-02-07

## 2025-01-28 RX ORDER — KETOROLAC TROMETHAMINE 30 MG/ML
30 INJECTION, SOLUTION INTRAMUSCULAR; INTRAVENOUS
Status: COMPLETED | OUTPATIENT
Start: 2025-01-28 | End: 2025-01-28

## 2025-01-28 RX ORDER — LIDOCAINE HYDROCHLORIDE 10 MG/ML
5 INJECTION, SOLUTION EPIDURAL; INFILTRATION; INTRACAUDAL; PERINEURAL
Status: COMPLETED | OUTPATIENT
Start: 2025-01-28 | End: 2025-01-28

## 2025-01-28 RX ADMIN — LIDOCAINE HYDROCHLORIDE 50 MG: 10 INJECTION, SOLUTION EPIDURAL; INFILTRATION; INTRACAUDAL; PERINEURAL at 10:01

## 2025-01-28 RX ADMIN — KETOROLAC TROMETHAMINE 30 MG: 30 INJECTION, SOLUTION INTRAMUSCULAR at 11:01

## 2025-01-28 NOTE — Clinical Note
"Allan Pitts" Manuel was seen and treated in our emergency department on 1/28/2025.  He may return to work on 02/01/2025.       If you have any questions or concerns, please don't hesitate to call.      Marvel Andrews MD"

## 2025-01-29 VITALS
DIASTOLIC BLOOD PRESSURE: 88 MMHG | TEMPERATURE: 99 F | HEART RATE: 91 BPM | BODY MASS INDEX: 33.13 KG/M2 | HEIGHT: 72 IN | SYSTOLIC BLOOD PRESSURE: 151 MMHG | RESPIRATION RATE: 17 BRPM | OXYGEN SATURATION: 98 % | WEIGHT: 244.63 LBS

## 2025-01-29 NOTE — ED PROVIDER NOTES
Encounter Date: 1/28/2025       History     Chief Complaint   Patient presents with    Pilonidal Disease     Pt c/o pilonidal cyst that has been inflamed x2 days     Presents with a pilonidal cyst, states multiple I/Ds. Was told need surgical management but have been waiting until he could take some off work days    The history is provided by the patient and a relative.     Review of patient's allergies indicates:  No Known Allergies  Past Medical History:   Diagnosis Date    Diabetes mellitus      Past Surgical History:   Procedure Laterality Date    ORIF FACIAL FRACTURE Left      Family History   Problem Relation Name Age of Onset    Kidney failure Mother      Diabetes type I Mother      Hypertension Father      Diabetes type I Father       Social History     Tobacco Use    Smoking status: Every Day     Current packs/day: 1.00     Types: Cigarettes    Smokeless tobacco: Never   Substance Use Topics    Alcohol use: Never    Drug use: Yes     Frequency: 3.0 times per week     Types: Marijuana     Review of Systems   Skin:  Positive for wound.       Physical Exam     Initial Vitals [01/28/25 2205]   BP Pulse Resp Temp SpO2   (!) 156/99 98 18 98.6 °F (37 °C) 100 %      MAP       --         Physical Exam    Nursing note and vitals reviewed.  Constitutional: He appears well-developed and well-nourished.   HENT:   Head: Normocephalic and atraumatic.   Eyes: Pupils are equal, round, and reactive to light.   Neck: Neck supple.   Normal range of motion.  Cardiovascular:  Regular rhythm, normal heart sounds and intact distal pulses.           Pulmonary/Chest: Breath sounds normal. No respiratory distress.   Abdominal: Abdomen is soft. Bowel sounds are normal. He exhibits no distension. There is no abdominal tenderness. There is no rebound and no guarding.   Genitourinary:    Genitourinary Comments: Palpable fluid collection with fluctuance among gluteal crest, upper, mild purulent drainage.     Musculoskeletal:          "General: No edema. Normal range of motion.      Cervical back: Normal range of motion and neck supple.     Neurological: He is alert and oriented to person, place, and time. He has normal strength. GCS score is 15. GCS eye subscore is 4. GCS verbal subscore is 5. GCS motor subscore is 6.   Skin: Skin is warm and dry. No rash noted.   Psychiatric: His behavior is normal. Thought content normal.         ED Course   I & D - Incision and Drainage    Date/Time: 1/28/2025 11:27 PM  Location procedure was performed: [facility] EMERGENCY DEPARTMENT    Performed by: Marvel Andrews MD  Authorized by: Marvel Andrews MD  Assisting provider: Susie Almonte MD  Pre-operative diagnosis: Pilonidal infection  Post-operative diagnosis: Same  Consent Done: Yes  Consent: Verbal consent obtained.  Risks and benefits: risks, benefits and alternatives were discussed  Consent given by: patient  Patient understanding: patient states understanding of the procedure being performed  Patient consent: the patient's understanding of the procedure matches consent given  Procedure consent: procedure consent matches procedure scheduled  Relevant documents: relevant documents present and verified  Test results: test results available and properly labeled  Site marked: the operative site was not marked  Imaging studies: imaging studies not available  Required items: required blood products, implants, devices, and special equipment available  Patient identity confirmed: verbally with patient  Time out: Immediately prior to procedure a "time out" was called to verify the correct patient, procedure, equipment, support staff and site/side marked as required.  Type: pilonidal cyst  Body area: anogenital  Location details: gluteal cleft  Anesthesia: local infiltration    Anesthesia:  Local Anesthetic: lidocaine 1% without epinephrine  Anesthetic total: 5 mL    Patient sedated: no  Scalpel size: 11  Incision type: " single straight  Incision depth: dermal  Complexity: simple  Drainage: purulent and bloody  Drainage amount: moderate  Wound treatment: wound left open and wound packed  Packing material: 1/4 in iodoform gauze  Complications: No  Estimated blood loss (mL): 2  Specimens: No  Implants: No  Patient tolerance: Patient tolerated the procedure well with no immediate complications    Incision depth: dermal        Labs Reviewed   POCT GLUCOSE - Abnormal       Result Value    POCT Glucose 113 (*)    POCT GLUCOSE, HAND-HELD DEVICE          Imaging Results    None          Medications   LIDOcaine (PF) 10 mg/ml (1%) injection 50 mg (has no administration in time range)   ketorolac injection 30 mg (has no administration in time range)     Medical Decision Making  Amount and/or Complexity of Data Reviewed  Labs: ordered.    Risk  Prescription drug management.                                      Clinical Impression:  Final diagnoses:  [L05.01] Pilonidal abscess (Primary)          ED Disposition Condition    Discharge Stable          ED Prescriptions       Medication Sig Dispense Start Date End Date Auth. Provider    sulfamethoxazole-trimethoprim 800-160mg (BACTRIM DS) 800-160 mg Tab Take 1 tablet by mouth 2 (two) times daily. for 10 days 20 tablet 1/28/2025 2/7/2025 Marvel Andrews MD    ibuprofen (ADVIL,MOTRIN) 600 MG tablet Take 1 tablet (600 mg total) by mouth every 8 (eight) hours as needed. 20 tablet 1/28/2025 -- Marvel Andrews MD          Follow-up Information       Follow up With Specialties Details Why Contact Info    [facility] Emergency Dept Emergency Medicine  If symptoms worsen [address] [facility] [address] [phone]    [facility] General Surgery Services General Surgery Schedule an appointment as soon as possible for a visit in 1 month  [facility] [facility] [address] [phone]    Marvel Andrews MD Internal  Medicine Go in 2 days For wound re-check; packing removal [address] [facility] [address]  [phone]               Marvel Andrews MD  01/28/25 6064     Statement Selected

## 2025-01-29 NOTE — ED NOTES
Bed: 19  Expected date:   Expected time:   Means of arrival:   Comments:  [facility] transfer

## 2025-02-04 ENCOUNTER — HOSPITAL ENCOUNTER (EMERGENCY)
Facility: HOSPITAL | Age: 35
Discharge: HOME | End: 2025-02-04
Attending: FAMILY MEDICINE

## 2025-02-04 VITALS
DIASTOLIC BLOOD PRESSURE: 84 MMHG | RESPIRATION RATE: 18 BRPM | WEIGHT: 240.31 LBS | SYSTOLIC BLOOD PRESSURE: 138 MMHG | BODY MASS INDEX: 32.55 KG/M2 | OXYGEN SATURATION: 100 % | TEMPERATURE: 99 F | HEART RATE: 82 BPM | HEIGHT: 72 IN

## 2025-02-04 DIAGNOSIS — J06.9 UPPER RESPIRATORY TRACT INFECTION, UNSPECIFIED TYPE: ICD-10-CM

## 2025-02-04 DIAGNOSIS — L05.01 PILONIDAL CYST WITH ABSCESS: Primary | ICD-10-CM

## 2025-02-04 DIAGNOSIS — R05.9 COUGH: ICD-10-CM

## 2025-02-04 LAB
ALBUMIN SERPL-MCNC: 3.9 G/DL (ref 3.5–5)
ALBUMIN/GLOB SERPL: 0.8 RATIO (ref 1.1–2)
ALP SERPL-CCNC: 177 UNIT/L (ref 40–150)
ALT SERPL-CCNC: 87 UNIT/L (ref 0–55)
ANION GAP SERPL CALC-SCNC: 11 MEQ/L
AST SERPL-CCNC: 29 UNIT/L (ref 5–34)
BASOPHILS # BLD AUTO: 0.08 X10(3)/MCL
BASOPHILS NFR BLD AUTO: 0.7 %
BILIRUB SERPL-MCNC: 0.3 MG/DL
BUN SERPL-MCNC: 12.2 MG/DL (ref 8.9–20.6)
CALCIUM SERPL-MCNC: 10.5 MG/DL (ref 8.4–10.2)
CHLORIDE SERPL-SCNC: 102 MMOL/L (ref 98–107)
CO2 SERPL-SCNC: 26 MMOL/L (ref 22–29)
CREAT SERPL-MCNC: 1.03 MG/DL (ref 0.72–1.25)
CREAT/UREA NIT SERPL: 12
EOSINOPHIL # BLD AUTO: 0.38 X10(3)/MCL (ref 0–0.9)
EOSINOPHIL NFR BLD AUTO: 3.1 %
ERYTHROCYTE [DISTWIDTH] IN BLOOD BY AUTOMATED COUNT: 12.5 % (ref 11.5–17)
GFR SERPLBLD CREATININE-BSD FMLA CKD-EPI: >60 ML/MIN/1.73/M2
GLOBULIN SER-MCNC: 4.7 GM/DL (ref 2.4–3.5)
GLUCOSE SERPL-MCNC: 161 MG/DL (ref 74–100)
HCT VFR BLD AUTO: 45.8 % (ref 42–52)
HGB BLD-MCNC: 15.6 G/DL (ref 14–18)
HOLD SPECIMEN: NORMAL
IMM GRANULOCYTES # BLD AUTO: 0.05 X10(3)/MCL (ref 0–0.04)
IMM GRANULOCYTES NFR BLD AUTO: 0.4 %
LACTATE SERPL-SCNC: 1.7 MMOL/L (ref 0.5–2.2)
LYMPHOCYTES # BLD AUTO: 3.83 X10(3)/MCL (ref 0.6–4.6)
LYMPHOCYTES NFR BLD AUTO: 31.2 %
MCH RBC QN AUTO: 29.9 PG (ref 27–31)
MCHC RBC AUTO-ENTMCNC: 34.1 G/DL (ref 33–36)
MCV RBC AUTO: 87.9 FL (ref 80–94)
MONOCYTES # BLD AUTO: 0.79 X10(3)/MCL (ref 0.1–1.3)
MONOCYTES NFR BLD AUTO: 6.4 %
NEUTROPHILS # BLD AUTO: 7.15 X10(3)/MCL (ref 2.1–9.2)
NEUTROPHILS NFR BLD AUTO: 58.2 %
NRBC BLD AUTO-RTO: 0 %
PLATELET # BLD AUTO: 428 X10(3)/MCL (ref 130–400)
PMV BLD AUTO: 9.6 FL (ref 7.4–10.4)
POTASSIUM SERPL-SCNC: 3.8 MMOL/L (ref 3.5–5.1)
PROT SERPL-MCNC: 8.6 GM/DL (ref 6.4–8.3)
RBC # BLD AUTO: 5.21 X10(6)/MCL (ref 4.7–6.1)
SODIUM SERPL-SCNC: 139 MMOL/L (ref 136–145)
WBC # BLD AUTO: 12.28 X10(3)/MCL (ref 4.5–11.5)

## 2025-02-04 PROCEDURE — 85025 COMPLETE CBC W/AUTO DIFF WBC: CPT | Performed by: NURSE PRACTITIONER

## 2025-02-04 PROCEDURE — 99284 EMERGENCY DEPT VISIT MOD MDM: CPT | Mod: 25

## 2025-02-04 PROCEDURE — 80053 COMPREHEN METABOLIC PANEL: CPT | Performed by: NURSE PRACTITIONER

## 2025-02-04 PROCEDURE — 25000003 PHARM REV CODE 250: Performed by: NURSE PRACTITIONER

## 2025-02-04 PROCEDURE — 87040 BLOOD CULTURE FOR BACTERIA: CPT | Performed by: NURSE PRACTITIONER

## 2025-02-04 PROCEDURE — 83605 ASSAY OF LACTIC ACID: CPT | Performed by: NURSE PRACTITIONER

## 2025-02-04 RX ORDER — CLINDAMYCIN HYDROCHLORIDE 150 MG/1
300 CAPSULE ORAL EVERY 8 HOURS
Qty: 42 CAPSULE | Refills: 0 | Status: SHIPPED | OUTPATIENT
Start: 2025-02-04 | End: 2025-02-11

## 2025-02-04 RX ORDER — INDOMETHACIN 25 MG/1
25 CAPSULE ORAL 2 TIMES DAILY PRN
Qty: 14 CAPSULE | Refills: 0 | Status: SHIPPED | OUTPATIENT
Start: 2025-02-04

## 2025-02-04 RX ORDER — CETIRIZINE HYDROCHLORIDE 10 MG/1
10 TABLET ORAL DAILY
Qty: 14 TABLET | Refills: 0 | Status: SHIPPED | OUTPATIENT
Start: 2025-02-04 | End: 2025-02-18

## 2025-02-04 RX ORDER — HYDROCODONE BITARTRATE AND ACETAMINOPHEN 5; 325 MG/1; MG/1
1 TABLET ORAL
Status: COMPLETED | OUTPATIENT
Start: 2025-02-04 | End: 2025-02-04

## 2025-02-04 RX ORDER — PROMETHAZINE HYDROCHLORIDE AND DEXTROMETHORPHAN HYDROBROMIDE 6.25; 15 MG/5ML; MG/5ML
5 SYRUP ORAL EVERY 6 HOURS PRN
Qty: 100 ML | Refills: 0 | Status: SHIPPED | OUTPATIENT
Start: 2025-02-04 | End: 2025-02-09

## 2025-02-04 RX ADMIN — HYDROCODONE BITARTRATE AND ACETAMINOPHEN 1 TABLET: 5; 325 TABLET ORAL at 07:02

## 2025-02-04 NOTE — Clinical Note
"Allan Pitts" Manuel was seen and treated in our emergency department on 2/4/2025.  He may return to work on 02/07/2025.       If you have any questions or concerns, please don't hesitate to call.      SUKHDEV Mehta RN    "

## 2025-02-05 NOTE — ED PROVIDER NOTES
"Encounter Date: 2/4/2025       History     Chief Complaint   Patient presents with    Wound Check    Cough     Patient reports to the ed secondary to "re-evaluation" of pilonidal cyst to middle of buttock. Also states drainage, cough, and subjective fever. Current temp=98.9. nakul bustamante     Patient is a 34-year-old male who presents for evaluation of an I and D site just above his rectal crease.  Reports being told that he had a "pilonidal cyst" to area and had it opened with packing placed on 01/28/2025.  Patient reports being told that "the packing would come out on its own" so he did not return for packing removal 2 days after procedure was performed.  Patient admits that after his ER visit, he developed a cough which has worsened over the last 5 days.  Denies chest pain, shortness of breath, abdominal pain, or loss of bowel or bladder control.      Review of patient's allergies indicates:  No Known Allergies  Past Medical History:   Diagnosis Date    Diabetes mellitus      Past Surgical History:   Procedure Laterality Date    ORIF FACIAL FRACTURE Left      Family History   Problem Relation Name Age of Onset    Kidney failure Mother      Diabetes type I Mother      Hypertension Father      Diabetes type I Father       Social History     Tobacco Use    Smoking status: Every Day     Current packs/day: 1.00     Types: Cigarettes    Smokeless tobacco: Never   Substance Use Topics    Alcohol use: Never    Drug use: Yes     Frequency: 3.0 times per week     Types: Marijuana     Review of Systems   Constitutional:  Negative for chills, diaphoresis, fatigue and fever.   HENT:  Negative for facial swelling, postnasal drip, rhinorrhea, sinus pressure, sinus pain, sore throat and trouble swallowing.    Respiratory:  Positive for cough. Negative for chest tightness, shortness of breath and wheezing.    Cardiovascular:  Negative for chest pain, palpitations and leg swelling.   Gastrointestinal:  Negative for abdominal pain, " diarrhea, nausea and vomiting.   Genitourinary:  Negative for dysuria, flank pain, hematuria and urgency.   Musculoskeletal:  Negative for arthralgias, back pain and myalgias.   Skin:  Positive for wound. Negative for color change and rash.   Neurological:  Negative for dizziness, syncope, weakness and headaches.   Hematological:  Does not bruise/bleed easily.   All other systems reviewed and are negative.      Physical Exam     Initial Vitals [02/04/25 1921]   BP Pulse Resp Temp SpO2   (!) 143/94 87 18 98.9 °F (37.2 °C) 100 %      MAP       --         Physical Exam    Nursing note and vitals reviewed.  Constitutional: Vital signs are normal. He appears well-developed and well-nourished.   HENT:   Head: Normocephalic.   Nose: Nose normal. Mouth/Throat: Oropharynx is clear and moist.   Eyes: Conjunctivae and EOM are normal. Pupils are equal, round, and reactive to light.   Neck: Neck supple.   Normal range of motion.  Cardiovascular:  Normal rate, regular rhythm, normal heart sounds and intact distal pulses.           Pulmonary/Chest: Effort normal and breath sounds normal. No respiratory distress. He has no wheezes. He has no rhonchi. He has no rales. He exhibits no tenderness.   Dry cough noted   Abdominal: Abdomen is soft and flat. Bowel sounds are normal. There is no abdominal tenderness. There is no rebound, no guarding, no tenderness at McBurney's point and negative Thornton's sign.   Musculoskeletal:         General: Normal range of motion.      Cervical back: Normal range of motion and neck supple.     Neurological: He is alert and oriented to person, place, and time. He has normal strength.   Skin: Skin is warm and dry. Capillary refill takes less than 2 seconds.        Psychiatric: He has a normal mood and affect. His behavior is normal. Judgment and thought content normal.         ED Course   Procedures  Labs Reviewed   COMPREHENSIVE METABOLIC PANEL - Abnormal       Result Value    Sodium 139      Potassium  3.8      Chloride 102      CO2 26      Glucose 161 (*)     Blood Urea Nitrogen 12.2      Creatinine 1.03      Calcium 10.5 (*)     Protein Total 8.6 (*)     Albumin 3.9      Globulin 4.7 (*)     Albumin/Globulin Ratio 0.8 (*)     Bilirubin Total 0.3       (*)     ALT 87 (*)     AST 29      eGFR >60      Anion Gap 11.0      BUN/Creatinine Ratio 12     CBC WITH DIFFERENTIAL - Abnormal    WBC 12.28 (*)     RBC 5.21      Hgb 15.6      Hct 45.8      MCV 87.9      MCH 29.9      MCHC 34.1      RDW 12.5      Platelet 428 (*)     MPV 9.6      Neut % 58.2      Lymph % 31.2      Mono % 6.4      Eos % 3.1      Basophil % 0.7      Imm Grans % 0.4      Neut # 7.15      Lymph # 3.83      Mono # 0.79      Eos # 0.38      Baso # 0.08      Imm Gran # 0.05 (*)     NRBC% 0.0     LACTIC ACID, PLASMA - Normal    Lactic Acid Level 1.7     BLOOD CULTURE [facility]   BLOOD CULTURE [facility]   CBC W/ AUTO DIFFERENTIAL    Narrative:     The following orders were created for panel order CBC auto differential.  Procedure                               Abnormality         Status                     ---------                               -----------         ------                     CBC with Differential[[id number]]       Abnormal            Final result                 Please view results for these tests on the individual orders.   EXTRA TUBES    Narrative:     The following orders were created for panel order EXTRA TUBES.  Procedure                               Abnormality         Status                     ---------                               -----------         ------                     Light Blue Top Hold[[id number]]                             In process                 Light Green Top Hold[[id number]]                            In process                 Lavender Top Hold[[id number]]                               In process                 Gold Top Hold[[id number]]                                   In process                   Please view  results for these tests on the individual orders.   LIGHT BLUE TOP HOLD   LIGHT GREEN TOP HOLD   LAVENDER TOP HOLD   GOLD TOP HOLD          Imaging Results              X-Ray Chest 1 View (Final result)  Result time 02/04/25 20:08:55      Final result by Reilly Flowers MD (02/04/25 20:08:55)                   Impression:      No acute cardiopulmonary process identified.      Electronically signed by: Reilly Flowers  Date:    02/04/2025  Time:    20:08               Narrative:    EXAMINATION:  XR CHEST 1 VIEW    CLINICAL HISTORY:  Cough, unspecified    TECHNIQUE:  One view    COMPARISON:  None available.    FINDINGS:  Cardiopericardial silhouette is within normal limits. Lungs are without dense focal or segmental consolidation, congestive process, pleural effusions or pneumothorax.                                       Medications   HYDROcodone-acetaminophen 5-325 mg per tablet 1 tablet (1 tablet Oral Given 2/4/25 1938)     Medical Decision Making  Differential:  Sepsis  Pilonidal abscess   Cellulitis   Pneumonia    Amount and/or Complexity of Data Reviewed  Labs: ordered.  Radiology: ordered.    Risk  Prescription drug management.               ED Course as of 02/04/25 2033 Tue Feb 04, 2025 2024 Iodoform ribbon removed from wound bed.  Moderate amount of purulent drainage expressed from wound with a tactile pressure.   Affected tissue remains neurovascularly intact postprocedure. Patient reports minimal discomfort during procedure area was cleaned with saline and Betadine per ED nursing staff and new dressing applied.  [JA]   2030 Given strict ED return precautions. I have spoken with the patient and/or caregivers. I have explained the patient's condition, diagnoses and treatment plan based on the information available to me at this time. I have answered the patient's and/or caregiver's questions and addressed any concerns. The patient and/or caregivers have as good an understanding of the patient's diagnosis,  condition and treatment plan as can be expected at this point. The vital signs have been stable. The patient's condition is stable and appropriate for discharge from the emergency department.      The patient will pursue further outpatient evaluation with the primary care physician or other designated or consulting physician as outlined in the discharge instructions. The patient and/or caregivers are agreeable to this plan of care and follow-up instructions have been explained in detail. The patient and/or caregivers have received these instructions in written format and have expressed an understanding of the discharge instructions. The patient and/or caregivers are aware that any significant change in condition or worsening of symptoms should prompt an immediate return to this or the closest emergency department or a call to 911.   [JA]   2031 Given strict ED return precautions. I have spoken with the patient and/or caregivers. I have explained the patient's condition, diagnoses and treatment plan based on the information available to me at this time. I have answered the patient's and/or caregiver's questions and addressed any concerns. The patient and/or caregivers have as good an understanding of the patient's diagnosis, condition and treatment plan as can be expected at this point. The vital signs have been stable. The patient's condition is stable and appropriate for discharge from the emergency department.      The patient will pursue further outpatient evaluation with the primary care physician or other designated or consulting physician as outlined in the discharge instructions. The patient and/or caregivers are agreeable to this plan of care and follow-up instructions have been explained in detail. The patient and/or caregivers have received these instructions in written format and have expressed an understanding of the discharge instructions. The patient and/or caregivers are aware that any significant  change in condition or worsening of symptoms should prompt an immediate return to this or the closest emergency department or a call to 911.   [JA]      ED Course User Index  [JA] Allan Garcia Jr., FNP                           Clinical Impression:  Final diagnoses:  [R05.9] Cough  [L05.01] Pilonidal cyst with abscess (Primary)  [J06.9] Upper respiratory tract infection, unspecified type          ED Disposition Condition    Discharge Stable          ED Prescriptions       Medication Sig Dispense Start Date End Date Auth. Provider    clindamycin (CLEOCIN) 150 MG capsule Take 2 capsules (300 mg total) by mouth every 8 (eight) hours. for 7 days 42 capsule 2/4/2025 2/11/2025 Allan Garcia Jr., FNP    indomethacin (INDOCIN) 25 MG capsule Take 1 capsule (25 mg total) by mouth 2 (two) times daily as needed (pain). 14 capsule 2/4/2025 -- Allan Garcia Jr., FNP    cetirizine (ZYRTEC) 10 MG tablet Take 1 tablet (10 mg total) by mouth once daily. for 14 days 14 tablet 2/4/2025 2/18/2025 Allan Garcia Jr., FNP    promethazine-dextromethorphan (PROMETHAZINE-DM) 6.25-15 mg/5 mL Syrp Take 5 mLs by mouth every 6 (six) hours as needed (cough). 100 mL 2/4/2025 2/9/2025 Allan Garcia Jr., FNP          Follow-up Information       Follow up With Specialties Details Why Contact Info    [facility] - Emergency Dept Emergency Medicine In 3 days As needed, If symptoms worsen [address] [facility] [address] [phone]             Allan Garcia Jr., FNP  02/04/25 2033

## 2025-02-05 NOTE — DISCHARGE INSTRUCTIONS
Return to the [facility] ED in 2 days for evaluation of your wound.  Follow up with your primary care physician in 3-5 days for follow up evaluation.  Take medication as prescribed.  Increase oral fluid.

## 2025-02-10 LAB
BACTERIA BLD CULT: NORMAL
BACTERIA BLD CULT: NORMAL

## 2025-05-27 ENCOUNTER — ON-DEMAND VIRTUAL (OUTPATIENT)
Dept: URGENT CARE | Facility: CLINIC | Age: 35
End: 2025-05-27
Payer: MEDICAID

## 2025-05-27 DIAGNOSIS — Z20.2 EXPOSURE TO CHLAMYDIA: Primary | ICD-10-CM

## 2025-05-27 PROCEDURE — 98005 SYNCH AUDIO-VIDEO EST LOW 20: CPT | Mod: 95,,, | Performed by: NURSE PRACTITIONER

## 2025-05-27 RX ORDER — DOXYCYCLINE 100 MG/1
100 CAPSULE ORAL 2 TIMES DAILY
Qty: 14 CAPSULE | Refills: 0 | Status: SHIPPED | OUTPATIENT
Start: 2025-05-27 | End: 2025-06-03

## 2025-05-27 NOTE — PROGRESS NOTES
Subjective:      Patient ID: Allan Jackson Jr. is a 34 y.o. male.    Vitals:  vitals were not taken for this visit.     Chief Complaint: Exposure to STD      Visit Type: TELE AUDIOVISUAL    Patient Location: Home [place], [place]    Present with the patient at the time of consultation: TELEMED PRESENT WITH PATIENT: None    Past Medical History:   Diagnosis Date    Diabetes mellitus      Past Surgical History:   Procedure Laterality Date    ORIF FACIAL FRACTURE Left      Review of patient's allergies indicates:  No Known Allergies  Medications Ordered Prior to Encounter[1]  Family History   Problem Relation Name Age of Onset    Kidney failure Mother      Diabetes type I Mother      Hypertension Father      Diabetes type I Father         Medications Ordered                [id number] - [address] AT [facility] & [address] [address]    Telephone: [phone]   Fax: [phone]   Hours: Open 24 hours                         E-Prescribed (1 of 1)              doxycycline (VIBRAMYCIN) 100 MG Cap    Sig: Take 1 capsule (100 mg total) by mouth 2 (two) times a day. for 7 days       Start: 5/27/25     Quantity: 14 capsule Refills: 0                           Ohs Peq Odvv Intake    5/27/2025 11:11 AM CDT - Filed by Patient   What is your current physical address in the event of a medical emergency? [address]   Are you able to take your vital signs? No   Please attach any relevant images or files    Is your employer contracted with [employer]? No         Patient doing a virtual visit requesting treatment for chlamydia.  He states his partner tested positive.  All other STD testing was negative.  He does report frequent urination.  No dysuria.  No discharge from the penis.    Exposure to STD  The patient's pertinent negatives include no penile discharge. Associated symptoms include frequency. Pertinent negatives include no  dysuria.       Genitourinary:  Positive for frequency. Negative for dysuria and penile discharge.        Objective:   The physical exam was conducted virtually.  Physical Exam   Constitutional: He does not appear ill. No distress.   HENT:   Head: Normocephalic and atraumatic.   Nose: Nose normal.   Eyes: Conjunctivae are normal. Right eye exhibits no discharge. Left eye exhibits no discharge.   Pulmonary/Chest: Effort normal. No stridor. No respiratory distress.   Abdominal: Normal appearance.   Neurological: He is alert and at baseline.   Psychiatric: His behavior is normal. Mood and thought content normal.       Assessment:     1. Exposure to chlamydia        Plan:       Exposure to chlamydia  -     doxycycline (VIBRAMYCIN) 100 MG Cap; Take 1 capsule (100 mg total) by mouth 2 (two) times a day. for 7 days  Dispense: 14 capsule; Refill: 0    Treatment as above  No sex until all partners or treated +an additional week  Safe sex recommended  If not improving with treatment needs in-person evaluation for STD testing                   [1]   Current Outpatient Medications on File Prior to Visit   Medication Sig Dispense Refill    albuterol (PROVENTIL HFA) 90 mcg/actuation inhaler Inhale 2 puffs into the lungs every 6 (six) hours as needed for Wheezing. Rescue (Patient not taking: Reported on 10/10/2023) 18 g 2    cetirizine (ZYRTEC) 10 MG tablet Take 1 tablet (10 mg total) by mouth once daily. for 14 days 14 tablet 0    ibuprofen (ADVIL,MOTRIN) 600 MG tablet Take 1 tablet (600 mg total) by mouth every 8 (eight) hours as needed. 20 tablet 0    indomethacin (INDOCIN) 25 MG capsule Take 1 capsule (25 mg total) by mouth 2 (two) times daily as needed (pain). 14 capsule 0    nicotine (NICODERM CQ) 21 mg/24 hr Place 1 patch onto the skin once daily. 30 patch 3     No current facility-administered medications on file prior to visit.

## 2025-06-30 ENCOUNTER — PATIENT MESSAGE (OUTPATIENT)
Dept: URGENT CARE | Facility: CLINIC | Age: 35
End: 2025-06-30